# Patient Record
Sex: MALE | Race: WHITE | Employment: FULL TIME
[De-identification: names, ages, dates, MRNs, and addresses within clinical notes are randomized per-mention and may not be internally consistent; named-entity substitution may affect disease eponyms.]

---

## 2019-10-02 ENCOUNTER — HEALTH MAINTENANCE LETTER (OUTPATIENT)
Age: 44
End: 2019-10-02

## 2021-01-15 ENCOUNTER — HEALTH MAINTENANCE LETTER (OUTPATIENT)
Age: 46
End: 2021-01-15

## 2021-06-08 ENCOUNTER — HOSPITAL ENCOUNTER (EMERGENCY)
Facility: CLINIC | Age: 46
Discharge: HOME | End: 2021-06-08
Attending: EMERGENCY MEDICINE | Admitting: EMERGENCY MEDICINE
Payer: COMMERCIAL

## 2021-06-08 ENCOUNTER — APPOINTMENT (OUTPATIENT)
Dept: CT IMAGING | Facility: CLINIC | Age: 46
End: 2021-06-08
Attending: EMERGENCY MEDICINE
Payer: COMMERCIAL

## 2021-06-08 VITALS
OXYGEN SATURATION: 96 % | SYSTOLIC BLOOD PRESSURE: 153 MMHG | WEIGHT: 264 LBS | TEMPERATURE: 97.9 F | DIASTOLIC BLOOD PRESSURE: 103 MMHG | RESPIRATION RATE: 18 BRPM | HEIGHT: 71 IN | HEART RATE: 78 BPM | BODY MASS INDEX: 36.96 KG/M2

## 2021-06-08 DIAGNOSIS — N20.0 KIDNEY STONE: ICD-10-CM

## 2021-06-08 DIAGNOSIS — N23 RENAL COLIC: ICD-10-CM

## 2021-06-08 LAB
ALBUMIN UR-MCNC: 10 MG/DL
ANION GAP SERPL CALCULATED.3IONS-SCNC: 3 MMOL/L (ref 3–14)
APPEARANCE UR: CLEAR
BASOPHILS # BLD AUTO: 0.1 10E9/L (ref 0–0.2)
BASOPHILS NFR BLD AUTO: 0.8 %
BILIRUB UR QL STRIP: NEGATIVE
BUN SERPL-MCNC: 20 MG/DL (ref 7–30)
CALCIUM SERPL-MCNC: 8.5 MG/DL (ref 8.5–10.1)
CHLORIDE SERPL-SCNC: 107 MMOL/L (ref 94–109)
CO2 SERPL-SCNC: 29 MMOL/L (ref 20–32)
COLOR UR AUTO: YELLOW
CREAT SERPL-MCNC: 1.02 MG/DL (ref 0.66–1.25)
DIFFERENTIAL METHOD BLD: ABNORMAL
EOSINOPHIL # BLD AUTO: 0.1 10E9/L (ref 0–0.7)
EOSINOPHIL NFR BLD AUTO: 1 %
ERYTHROCYTE [DISTWIDTH] IN BLOOD BY AUTOMATED COUNT: 13.8 % (ref 10–15)
GFR SERPL CREATININE-BSD FRML MDRD: 87 ML/MIN/{1.73_M2}
GLUCOSE SERPL-MCNC: 110 MG/DL (ref 70–99)
GLUCOSE UR STRIP-MCNC: NEGATIVE MG/DL
HCT VFR BLD AUTO: 47 % (ref 40–53)
HGB BLD-MCNC: 14.9 G/DL (ref 13.3–17.7)
HGB UR QL STRIP: ABNORMAL
IMM GRANULOCYTES # BLD: 0 10E9/L (ref 0–0.4)
IMM GRANULOCYTES NFR BLD: 0.4 %
KETONES UR STRIP-MCNC: NEGATIVE MG/DL
LEUKOCYTE ESTERASE UR QL STRIP: NEGATIVE
LYMPHOCYTES # BLD AUTO: 2.8 10E9/L (ref 0.8–5.3)
LYMPHOCYTES NFR BLD AUTO: 25.9 %
MCH RBC QN AUTO: 26.3 PG (ref 26.5–33)
MCHC RBC AUTO-ENTMCNC: 31.7 G/DL (ref 31.5–36.5)
MCV RBC AUTO: 83 FL (ref 78–100)
MONOCYTES # BLD AUTO: 0.9 10E9/L (ref 0–1.3)
MONOCYTES NFR BLD AUTO: 8.3 %
MUCOUS THREADS #/AREA URNS LPF: PRESENT /LPF
NEUTROPHILS # BLD AUTO: 6.9 10E9/L (ref 1.6–8.3)
NEUTROPHILS NFR BLD AUTO: 63.6 %
NITRATE UR QL: NEGATIVE
NRBC # BLD AUTO: 0 10*3/UL
NRBC BLD AUTO-RTO: 0 /100
PH UR STRIP: 5.5 PH (ref 5–7)
PLATELET # BLD AUTO: 302 10E9/L (ref 150–450)
POTASSIUM SERPL-SCNC: 3.8 MMOL/L (ref 3.4–5.3)
RBC # BLD AUTO: 5.67 10E12/L (ref 4.4–5.9)
RBC #/AREA URNS AUTO: 163 /HPF (ref 0–2)
SODIUM SERPL-SCNC: 139 MMOL/L (ref 133–144)
SOURCE: ABNORMAL
SP GR UR STRIP: 1.03 (ref 1–1.03)
UROBILINOGEN UR STRIP-MCNC: NORMAL MG/DL (ref 0–2)
WBC # BLD AUTO: 10.8 10E9/L (ref 4–11)
WBC #/AREA URNS AUTO: 2 /HPF (ref 0–5)

## 2021-06-08 PROCEDURE — 74176 CT ABD & PELVIS W/O CONTRAST: CPT

## 2021-06-08 PROCEDURE — 81001 URINALYSIS AUTO W/SCOPE: CPT | Performed by: EMERGENCY MEDICINE

## 2021-06-08 PROCEDURE — 85025 COMPLETE CBC W/AUTO DIFF WBC: CPT | Performed by: EMERGENCY MEDICINE

## 2021-06-08 PROCEDURE — 99284 EMERGENCY DEPT VISIT MOD MDM: CPT | Mod: 25

## 2021-06-08 PROCEDURE — 80048 BASIC METABOLIC PNL TOTAL CA: CPT | Performed by: EMERGENCY MEDICINE

## 2021-06-08 RX ORDER — TAMSULOSIN HYDROCHLORIDE 0.4 MG/1
0.4 CAPSULE ORAL EVERY EVENING
Qty: 10 CAPSULE | Refills: 0 | Status: SHIPPED | OUTPATIENT
Start: 2021-06-08 | End: 2021-06-18

## 2021-06-08 RX ORDER — ONDANSETRON 4 MG/1
4 TABLET, ORALLY DISINTEGRATING ORAL EVERY 8 HOURS PRN
Qty: 15 TABLET | Refills: 0 | Status: SHIPPED | OUTPATIENT
Start: 2021-06-08

## 2021-06-08 RX ORDER — IBUPROFEN 600 MG/1
600 TABLET, FILM COATED ORAL EVERY 6 HOURS PRN
Qty: 30 TABLET | Refills: 0 | Status: SHIPPED | OUTPATIENT
Start: 2021-06-08 | End: 2021-07-08

## 2021-06-08 ASSESSMENT — MIFFLIN-ST. JEOR: SCORE: 2099.63

## 2021-06-08 ASSESSMENT — ENCOUNTER SYMPTOMS
HEMATURIA: 0
FREQUENCY: 1
BACK PAIN: 1
FEVER: 0
JOINT SWELLING: 0
ABDOMINAL PAIN: 1
SHORTNESS OF BREATH: 0

## 2021-06-09 NOTE — ED PROVIDER NOTES
History   Chief Complaint:  Kidney problem     The history is provided by the patient.      Roque Chery is a 45 year old male with history of right renal mass excision which turned out to be a benign renal cyst who presents with a kidney problem. The patient [place], at which time he felt a brief stinging in his urethra. In the days following, the patient has been experiencing frequency and urgency which persists despite fully emptying his bladder. Roque also reports a dull throbbing sensation in his abdomen. He also mentioned that while in the waiting room, the right side of his back where his kidney is located began to hurt. The patient denies any hematuria, fever, shortness of breath, swelling, testicular pain/swelling, or any history of kidney stones. He denies any  exacerbating factors for his symptoms. Roque took acetaminophen at about 1900 today. He notes that he has not been taking his anxiety medication for the past couple of weeks as he has not needed it. He denies any other symptoms.    Review of Systems   Constitutional: Negative for fever.   Respiratory: Negative for shortness of breath.    Cardiovascular: Negative for leg swelling.   Gastrointestinal: Positive for abdominal pain.   Genitourinary: Positive for frequency and penile pain. Negative for hematuria and testicular pain.   Musculoskeletal: Positive for back pain. Negative for joint swelling.   All other systems reviewed and are negative.      Allergies:  Penicillins    Medications:  Adderall    Past Medical History:    Late effects of cerebrovascular disease  Concentration deficit  Tinea versicolor   Abnormal MRI of abdomen  Disturbance of skin sensation  Abnormal reflex  Painful respiration   Abnormal findings on radiological and other examination of genitourinary organs     Past Surgical History:    Removal of tonsils  Kidney surgery    Social History:  The patient presents to the ED  "alone.    Physical Exam     Patient Vitals for the past 24 hrs:   BP Temp Temp src Pulse Resp SpO2 Height Weight   06/08/21 2330 (!) 153/103 -- -- 78 -- 96 % -- --   06/08/21 2319 -- -- -- -- -- 96 % -- --   06/08/21 2315 (!) 142/86 -- -- -- -- -- -- --   06/08/21 2054 -- -- -- -- -- -- 1.803 m (5' 11\") 119.7 kg (264 lb)   06/08/21 2053 (!) 163/107 97.9  F (36.6  C) Oral 95 18 99 % -- --       Physical Exam  General: Well appearing, nontoxic. Resting comfortably. Sitting on end of ED stretcher.   Head: Scalp, face, and head appear normal   Eyes: Pupils are equal, round   Conjunctivae non-injected and sclerae white   ENT: The external nose is normal   Pinnae are normal   Neck: Normal range of motion   There is no rigidity noted   Trachea is in the midline   CV: Regular rate and rhythm   Normal S1/S2, no S3/S4   No murmur or rub. Radial pulses 2+ bilaterally.   Resp: Lungs are clear and equal bilaterally   There is no tachypnea   No increased work of breathing   No rales, wheezing, or rhonchi   GI: Abdomen is soft, obese, no rigidity or guarding   No distension, or mass. Mild right CVA tenderness.   No tenderness or rebound tenderness. No RUQ or RLQ tenderness to palpation.   MS: Normal muscular tone   Symmetric motor strength   No lower extremity edema   Skin: No rash or acute skin lesions noted   Neuro: Awake and alert   Speech is normal and fluent   Moves all extremities spontaneously   Psych: Normal affect. Appropriate interactions.       Emergency Department Course     Imaging:  CT abdomen pelvis without contrast  A 2 x 2 x 2 mm stone at the internal orifice of the right ureterovesicular junction causes mild right hydronephrosis.  As per radiology.    Laboratory:  CBC: WBC 10.8, HGB 14.9,      CMP: Glucose: 110(H) o/w WNL (Creatinine 1.02)     UA with microscopic: urine blood: large, protein albumin: 10, RBC/HPF: 163(H), mucous: present o/w WNL    Emergency Department Course:    Reviewed:  I reviewed " nursing notes, vitals and past medical history    Assessments:  2230 I obtained history and examined the patient as noted above.   2329 I rechecked the patient and explained findings.       Disposition:  The patient was discharged to home.     Impression & Plan     Medical Decision Making:  Roque Chery is a 45 year old male who presents with concerns of dysuria/frequency and mild flank pain. A broad differential diagnosis was considered including diverticulitis, ureterolithiasis, tumor, colitis, cholecystitis, aneurysm, dissection, volvulus, appendicitis, splenic issue, stomach pathology, ulcer, hydronephrosis, pneumonia, rib fracture, UTI, pyelonephritis amongst many other etiologies.   Signs and symptoms consistent with ureterolithiasis. CT shows 2mm stone at the right UVJ.  Patients pain is controlled in ED. No signs of infected stone/UTI.  Patient is hemodynamically stable in ED. Renal function is normal. No other concerning findings on ED workup. No evidence to suggest another alternative etiology. Pain control at home, flomax, push PO fluid intake and close PCP follow up recommended. Follow up with Urology for any complications or if stone does not pass. Return precautions were discussed with patient. The patient's questions were answered and the patient was agreeable with discharge.     Covid-19  Roque Chery was evaluated during a global COVID-19 pandemic, which necessitated consideration that the patient might be at risk for infection with the SARS-CoV-2 virus that causes COVID-19.   Applicable protocols for evaluation were followed during the patient's care.    Diagnosis:    ICD-10-CM    1. Kidney stone  N20.0    2. Renal colic  N23        Discharge Medications:  Discharge Medication List as of 6/8/2021 11:41 PM      START taking these medications    Details   ibuprofen (ADVIL/MOTRIN) 600 MG tablet Take 1 tablet (600 mg) by mouth every 6 hours as needed for fever or pain Take with food., Disp-30  tablet, R-0, Local Print      ondansetron (ZOFRAN ODT) 4 MG ODT tab Take 1 tablet (4 mg) by mouth every 8 hours as needed for nausea or vomiting, Disp-15 tablet, R-0, Local PrintMay substitute non-ODT formulation per patient preference/insurance coverage.      tamsulosin (FLOMAX) 0.4 MG capsule Take 1 capsule (0.4 mg) by mouth every evening for 10 doses Stop taking after kidney stone passes., Disp-10 capsule, R-0, Local Print             Scribe Disclosure:  I, Lon Cassidy, am serving as a scribe at 10:49 PM on 6/8/2021 to document services personally performed by Edwin Duke MD based on my observations and the provider's statements to me.          Edwin Duke MD  06/09/21 1145

## 2021-06-09 NOTE — ED TRIAGE NOTES
Here for concern for urinary symptoms. Stated that he felt a twinge to urethra since Saturday. Stated that the twinge sensation continues to worsens. Tonight having urinary frequency with very little coming out associated with generalized abdominal discomfort. Went to urgent care and was told to come to ED for further eval of kidney stones. ABCs intact.

## 2021-09-04 ENCOUNTER — HEALTH MAINTENANCE LETTER (OUTPATIENT)
Age: 46
End: 2021-09-04

## 2022-02-19 ENCOUNTER — HEALTH MAINTENANCE LETTER (OUTPATIENT)
Age: 47
End: 2022-02-19

## 2022-10-22 ENCOUNTER — HEALTH MAINTENANCE LETTER (OUTPATIENT)
Age: 47
End: 2022-10-22

## 2023-04-01 ENCOUNTER — HEALTH MAINTENANCE LETTER (OUTPATIENT)
Age: 48
End: 2023-04-01

## 2024-06-02 ENCOUNTER — HEALTH MAINTENANCE LETTER (OUTPATIENT)
Age: 49
End: 2024-06-02